# Patient Record
Sex: MALE | Race: WHITE | NOT HISPANIC OR LATINO | URBAN - METROPOLITAN AREA
[De-identification: names, ages, dates, MRNs, and addresses within clinical notes are randomized per-mention and may not be internally consistent; named-entity substitution may affect disease eponyms.]

---

## 2019-08-20 ENCOUNTER — EMERGENCY (EMERGENCY)
Facility: HOSPITAL | Age: 84
LOS: 1 days | Discharge: HOME | End: 2019-08-20
Attending: HOSPITALIST | Admitting: HOSPITALIST
Payer: MEDICARE

## 2019-08-20 VITALS
HEART RATE: 56 BPM | RESPIRATION RATE: 18 BRPM | SYSTOLIC BLOOD PRESSURE: 138 MMHG | OXYGEN SATURATION: 98 % | TEMPERATURE: 97 F | DIASTOLIC BLOOD PRESSURE: 50 MMHG

## 2019-08-20 VITALS
TEMPERATURE: 98 F | DIASTOLIC BLOOD PRESSURE: 50 MMHG | HEART RATE: 71 BPM | RESPIRATION RATE: 20 BRPM | SYSTOLIC BLOOD PRESSURE: 137 MMHG | OXYGEN SATURATION: 96 %

## 2019-08-20 LAB
ALBUMIN SERPL ELPH-MCNC: 4.5 G/DL — SIGNIFICANT CHANGE UP (ref 3.3–5)
ALP SERPL-CCNC: 52 U/L — SIGNIFICANT CHANGE UP (ref 40–120)
ALT FLD-CCNC: 12 U/L — SIGNIFICANT CHANGE UP (ref 4–41)
ANION GAP SERPL CALC-SCNC: 12 MMO/L — SIGNIFICANT CHANGE UP (ref 7–14)
AST SERPL-CCNC: 19 U/L — SIGNIFICANT CHANGE UP (ref 4–40)
BASOPHILS # BLD AUTO: 0.01 K/UL — SIGNIFICANT CHANGE UP (ref 0–0.2)
BASOPHILS NFR BLD AUTO: 0.2 % — SIGNIFICANT CHANGE UP (ref 0–2)
BILIRUB SERPL-MCNC: < 0.2 MG/DL — LOW (ref 0.2–1.2)
BUN SERPL-MCNC: 37 MG/DL — HIGH (ref 7–23)
CALCIUM SERPL-MCNC: 9.1 MG/DL — SIGNIFICANT CHANGE UP (ref 8.4–10.5)
CHLORIDE SERPL-SCNC: 103 MMOL/L — SIGNIFICANT CHANGE UP (ref 98–107)
CO2 SERPL-SCNC: 28 MMOL/L — SIGNIFICANT CHANGE UP (ref 22–31)
CREAT SERPL-MCNC: 1.8 MG/DL — HIGH (ref 0.5–1.3)
EOSINOPHIL # BLD AUTO: 0.22 K/UL — SIGNIFICANT CHANGE UP (ref 0–0.5)
EOSINOPHIL NFR BLD AUTO: 3.6 % — SIGNIFICANT CHANGE UP (ref 0–6)
GLUCOSE SERPL-MCNC: 127 MG/DL — HIGH (ref 70–99)
HCT VFR BLD CALC: 34.9 % — LOW (ref 39–50)
HGB BLD-MCNC: 10.7 G/DL — LOW (ref 13–17)
IMM GRANULOCYTES NFR BLD AUTO: 0.5 % — SIGNIFICANT CHANGE UP (ref 0–1.5)
LYMPHOCYTES # BLD AUTO: 2.29 K/UL — SIGNIFICANT CHANGE UP (ref 1–3.3)
LYMPHOCYTES # BLD AUTO: 37.6 % — SIGNIFICANT CHANGE UP (ref 13–44)
MCHC RBC-ENTMCNC: 28.8 PG — SIGNIFICANT CHANGE UP (ref 27–34)
MCHC RBC-ENTMCNC: 30.7 % — LOW (ref 32–36)
MCV RBC AUTO: 94.1 FL — SIGNIFICANT CHANGE UP (ref 80–100)
MONOCYTES # BLD AUTO: 0.62 K/UL — SIGNIFICANT CHANGE UP (ref 0–0.9)
MONOCYTES NFR BLD AUTO: 10.2 % — SIGNIFICANT CHANGE UP (ref 2–14)
NEUTROPHILS # BLD AUTO: 2.92 K/UL — SIGNIFICANT CHANGE UP (ref 1.8–7.4)
NEUTROPHILS NFR BLD AUTO: 47.9 % — SIGNIFICANT CHANGE UP (ref 43–77)
NRBC # FLD: 0.03 K/UL — SIGNIFICANT CHANGE UP (ref 0–0)
PLATELET # BLD AUTO: 145 K/UL — LOW (ref 150–400)
PMV BLD: 10.4 FL — SIGNIFICANT CHANGE UP (ref 7–13)
POTASSIUM SERPL-MCNC: 4.7 MMOL/L — SIGNIFICANT CHANGE UP (ref 3.5–5.3)
POTASSIUM SERPL-SCNC: 4.7 MMOL/L — SIGNIFICANT CHANGE UP (ref 3.5–5.3)
PROT SERPL-MCNC: 7.6 G/DL — SIGNIFICANT CHANGE UP (ref 6–8.3)
RBC # BLD: 3.71 M/UL — LOW (ref 4.2–5.8)
RBC # FLD: 14.3 % — SIGNIFICANT CHANGE UP (ref 10.3–14.5)
SODIUM SERPL-SCNC: 143 MMOL/L — SIGNIFICANT CHANGE UP (ref 135–145)
WBC # BLD: 6.09 K/UL — SIGNIFICANT CHANGE UP (ref 3.8–10.5)
WBC # FLD AUTO: 6.09 K/UL — SIGNIFICANT CHANGE UP (ref 3.8–10.5)

## 2019-08-20 PROCEDURE — 70450 CT HEAD/BRAIN W/O DYE: CPT | Mod: 26

## 2019-08-20 PROCEDURE — 76705 ECHO EXAM OF ABDOMEN: CPT | Mod: 26

## 2019-08-20 PROCEDURE — 71045 X-RAY EXAM CHEST 1 VIEW: CPT | Mod: 26

## 2019-08-20 PROCEDURE — 99284 EMERGENCY DEPT VISIT MOD MDM: CPT | Mod: GC

## 2019-08-20 PROCEDURE — 72170 X-RAY EXAM OF PELVIS: CPT | Mod: 26

## 2019-08-20 PROCEDURE — 72125 CT NECK SPINE W/O DYE: CPT | Mod: 26

## 2019-08-20 RX ORDER — SODIUM CHLORIDE 9 MG/ML
1000 INJECTION INTRAMUSCULAR; INTRAVENOUS; SUBCUTANEOUS ONCE
Refills: 0 | Status: COMPLETED | OUTPATIENT
Start: 2019-08-20 | End: 2019-08-20

## 2019-08-20 RX ORDER — DIAZEPAM 5 MG
2 TABLET ORAL ONCE
Refills: 0 | Status: DISCONTINUED | OUTPATIENT
Start: 2019-08-20 | End: 2019-08-20

## 2019-08-20 RX ORDER — DIAZEPAM 5 MG
1 TABLET ORAL
Qty: 6 | Refills: 0
Start: 2019-08-20 | End: 2019-08-22

## 2019-08-20 RX ORDER — ACETAMINOPHEN 500 MG
975 TABLET ORAL ONCE
Refills: 0 | Status: COMPLETED | OUTPATIENT
Start: 2019-08-20 | End: 2019-08-20

## 2019-08-20 RX ORDER — ONDANSETRON 8 MG/1
4 TABLET, FILM COATED ORAL ONCE
Refills: 0 | Status: COMPLETED | OUTPATIENT
Start: 2019-08-20 | End: 2019-08-20

## 2019-08-20 RX ADMIN — SODIUM CHLORIDE 1000 MILLILITER(S): 9 INJECTION INTRAMUSCULAR; INTRAVENOUS; SUBCUTANEOUS at 20:03

## 2019-08-20 RX ADMIN — SODIUM CHLORIDE 1000 MILLILITER(S): 9 INJECTION INTRAMUSCULAR; INTRAVENOUS; SUBCUTANEOUS at 18:23

## 2019-08-20 RX ADMIN — Medication 2 MILLIGRAM(S): at 18:12

## 2019-08-20 RX ADMIN — Medication 975 MILLIGRAM(S): at 20:02

## 2019-08-20 RX ADMIN — Medication 975 MILLIGRAM(S): at 18:12

## 2019-08-20 RX ADMIN — ONDANSETRON 4 MILLIGRAM(S): 8 TABLET, FILM COATED ORAL at 18:13

## 2019-08-20 NOTE — ED ADULT NURSE NOTE - NSIMPLEMENTINTERV_GEN_ALL_ED
Implemented All Fall with Harm Risk Interventions:  [place] to call system. Call bell, personal items and telephone within reach. Instruct patient to call for assistance. Room bathroom lighting operational. Non-slip footwear when patient is off stretcher. Physically safe environment: no spills, clutter or unnecessary equipment. Stretcher in lowest position, wheels locked, appropriate side rails in place. Provide visual cue, wrist band, yellow gown, etc. Monitor gait and stability. Monitor for mental status changes and reorient to person, place, and time. Review medications for side effects contributing to fall risk. Reinforce activity limits and safety measures with patient and family. Provide visual clues: red socks.

## 2019-08-20 NOTE — ED PROVIDER NOTE - PROGRESS NOTE DETAILS
Pt with vertigo improved with valium. Plan to discharge home with PRN valium x3 days and f.u with PCP.

## 2019-08-20 NOTE — ED PROVIDER NOTE - CARE PLAN
Principal Discharge DX:	Vertigo  Goal:	Resolution  Assessment and plan of treatment:	You presented to the ED with vertigo not responsive to meclizine. You were treated with valium and your vertigo improved. You had a CT scan of your head and neck which showed no acute fractures, bleeding, or misalignment. You have age-related changed in your cervical spine called spondylosis. Please take Valium as needed for vertigo episode. Please follow up with your PCP for further evaluation and treatment.  Secondary Diagnosis:	Gallstones

## 2019-08-20 NOTE — ED PROVIDER NOTE - PHYSICAL EXAMINATION
PHYSICAL EXAM:  GENERAL: NAD, well-groomed, well-developed  HEAD:  Atraumatic, Normocephalic  EYES: nystagmus with L lateral gaze, fatigues, atraumatic, sclera clear   ENMT: No tonsillar erythema, exudates, or enlargement; Moist mucous membranes  NECK: Supple, No JVD, no midline cspine tenderness, trachea midline   HEART: Regular rate and rhythm; No murmurs, rubs, or gallops  RESPIRATORY: CTA B/L, No W/R/R  ABDOMEN: Soft, Nontender, Nondistended  BACK: no midline or CVA tenderness, normal ROM  NEURO: A&Ox3, nonfocal, moving all extremities, 5/5 strength in extremities, normal speech/memory, nystagmus with L lateral gaze   EXTREMITIES:  2+ Peripheral Pulses, No clubbing, cyanosis, or edema, atraumatic, pelvis stable, joints stable and full ROM  SKIN: warm, dry, normal color, no rash, no lacerations

## 2019-08-20 NOTE — ED ADULT NURSE REASSESSMENT NOTE - NS ED NURSE REASSESS COMMENT FT1
Received report from FITZ Moe. Pt A&OX4. Pt Primarily [place] speaking,  services offered, Daughter at bedside requested to translate. Pt denies dizziness at this time. Will continue to monitor.

## 2019-08-20 NOTE — ED PROVIDER NOTE - OBJECTIVE STATEMENT
89 yoM, PMHx of DM, HTN, vertigo, prior knee replacements presents to Ed with granddaughter who he lives with. She states that he has had vertigo for years, recently saw PCP in last few weeks and given meclizine. Has not been relieving sx. Today felt very vertiginous and actually fell striking his head. ?brief LOC. Has been ambulatory since incident. Still feels dizzy. C/o neck pain. Denies abd/chest/back or extremity pain. Mild nausea associated with vertigo. No focal weakness, facial droop, speech changes. Also been having biliary colic for last few months.

## 2019-08-20 NOTE — ED ADULT NURSE NOTE - CHIEF COMPLAINT QUOTE
pt coming with dizziness started 2-3 wks as per granddaughter pt is been getting increase dizziness with syncope today injured his left side of his head,

## 2019-08-20 NOTE — ED PROVIDER NOTE - ATTENDING CONTRIBUTION TO CARE
89M with recent dx of vertigo on meclizine p/w episode of syncope. patient was feeling dizzy all day, no improvement with meclizine, got up to close the window and felt the room spinning around him. he then fell, hit his head and then passed out for a minute. no seizure-like activity. patient was able to get up and walk to the car, still feeling dizzy and now with ruq pain.   ***GEN - NAD; well appearing; A+O x3 ***HEAD - NC/AT ***EYES/NOSE - PEERL, EOMI, mucous membranes moist, no discharge, (+) horizontal nystagmus b/l ***THROAT: Oral cavity and pharynx normal. No inflammation, swelling, exudate, or lesions.  ***NECK: Neck supple, non-tender without lymphadenopathy, no masses, no thyromegaly.   ***PULMONARY - CTA b/l, symmetric breath sounds. ***CARDIAC -s1s2, RRR, no M,G,R  ***ABDOMEN - +BS, ND, NT, soft, no guarding, no rebound, no masses   ***BACK - no CVA tenderness, Normal  spine ***EXTREMITIES - symmetric pulses, 2+ dp, capillary refill < 2 seconds, no clubbing, no cyanosis, no edema ***SKIN - no rash or bruising   ***NEUROLOGIC - alert, CN 2-12 intact, reflexes nl, sensation nl, coordination nl, finger to nose nl, romberg negative, motor 5/5 RUE/LUE/RLE/ ***PSYCH - insight and judgment nl, memory nl, affect nl, thought nl  MDM: 89M with vertigo, fall with head strike and loc. r/o ich, fx. ct head, c-spine, cxr, pelvic xray, labs, valium, ekg. reassess.

## 2019-08-20 NOTE — ED PROVIDER NOTE - PLAN OF CARE
Resolution You presented to the ED with vertigo not responsive to meclizine. You were treated with valium and your vertigo improved. You had a CT scan of your head and neck which showed no acute fractures, bleeding, or misalignment. You have age-related changed in your cervical spine called spondylosis. Please take Valium as needed for vertigo episode. Please follow up with your PCP for further evaluation and treatment.

## 2019-08-20 NOTE — ED PROVIDER NOTE - NSFOLLOWUPINSTRUCTIONS_ED_ALL_ED_FT
You presented to the ED with vertigo not responsive to meclizine. You were treated with valium and your vertigo improved. You had a CT scan of your head and neck which showed no acute fractures, bleeding, or misalignment. You have age-related changed in your cervical spine called spondylosis. Please take Valium as needed for vertigo episode. Please follow up with your PCP for further evaluation and treatment.    You also had an ultrasound of your abdomen to evaluate for right sided abdominal pain. You were found to have gallstones on the ultrasound. If you experience recurrent pain, fevers, chills, nausea or vomiting,  please follow up with your PCP or return to the ED.

## 2019-08-20 NOTE — ED PROVIDER NOTE - CLINICAL SUMMARY MEDICAL DECISION MAKING FREE TEXT BOX
[place] PGY2- recent vertigo, persistent, meclizine not helpful, recent biliary colic, fall with [facility] today, ?brief LOC  no cp or sob, no focal weakness, no recent illness  nystagmus on exam otherwise normal neuro exam, no midline spinal tenderness, head appears atraumatic, joints stable  CTH, c spine, RUQ sono, cxr/pelvis xr, labs, valium, zofran  likely BPPV causing fall, d/c if feeling better and trauma w.u neg

## 2019-08-20 NOTE — ED ADULT NURSE NOTE - OBJECTIVE STATEMENT
Pt received in spot 26, A&OX4, Citizen of the [place] speaking.  Translation services offered, pt prefers granddaughter for translation.  Pt c/o intermittent dizziness for the past 2-3 weeks, worsening today where the room started to spin and he fell hitting the L side of his head on a dresser and causing approx 40secs of LOC.  Pt on aspirin.  Pt was recently placed on meclizine by PMD for vertigo.  Presently denying dizziness.  Denies any chest pain/SOB/HA/vision changes.  Pt also c/o LUQ pain intermittent for "months".  Labs sent.  Denies any nausea/vomiting.  Denies any urinary symptoms.  Will continue to monitor.

## 2023-05-13 NOTE — ED ADULT TRIAGE NOTE - CHIEF COMPLAINT QUOTE
pt coming with dizziness started 2-3 wks as per granddaughter pt is been getting increase dizziness with syncope today injured his left side of his head, yes